# Patient Record
Sex: MALE | Race: WHITE | Employment: OTHER | URBAN - METROPOLITAN AREA
[De-identification: names, ages, dates, MRNs, and addresses within clinical notes are randomized per-mention and may not be internally consistent; named-entity substitution may affect disease eponyms.]

---

## 2022-07-17 ENCOUNTER — HOSPITAL ENCOUNTER (EMERGENCY)
Age: 57
Discharge: HOME | End: 2022-07-17

## 2022-07-17 ENCOUNTER — APPOINTMENT (OUTPATIENT)
Dept: CT IMAGING | Age: 57
End: 2022-07-17

## 2022-07-17 VITALS
BODY MASS INDEX: 31.83 KG/M2 | HEIGHT: 68 IN | RESPIRATION RATE: 14 BRPM | OXYGEN SATURATION: 100 % | WEIGHT: 210 LBS | DIASTOLIC BLOOD PRESSURE: 75 MMHG | HEART RATE: 70 BPM | SYSTOLIC BLOOD PRESSURE: 167 MMHG | TEMPERATURE: 97.6 F

## 2022-07-17 DIAGNOSIS — S05.32XA: Primary | ICD-10-CM

## 2022-07-17 PROCEDURE — 70450 CT HEAD/BRAIN W/O DYE: CPT

## 2022-07-17 PROCEDURE — 90714 TD VACC NO PRESV 7 YRS+ IM: CPT | Performed by: PHYSICIAN ASSISTANT

## 2022-07-17 PROCEDURE — 6370000000 HC RX 637 (ALT 250 FOR IP): Performed by: PHYSICIAN ASSISTANT

## 2022-07-17 PROCEDURE — 70486 CT MAXILLOFACIAL W/O DYE: CPT

## 2022-07-17 PROCEDURE — 90471 IMMUNIZATION ADMIN: CPT | Performed by: PHYSICIAN ASSISTANT

## 2022-07-17 PROCEDURE — 6360000002 HC RX W HCPCS: Performed by: PHYSICIAN ASSISTANT

## 2022-07-17 PROCEDURE — 99284 EMERGENCY DEPT VISIT MOD MDM: CPT

## 2022-07-17 RX ORDER — OXYCODONE HYDROCHLORIDE AND ACETAMINOPHEN 5; 325 MG/1; MG/1
1 TABLET ORAL ONCE
Status: COMPLETED | OUTPATIENT
Start: 2022-07-17 | End: 2022-07-17

## 2022-07-17 RX ORDER — TETANUS AND DIPHTHERIA TOXOIDS ADSORBED 2; 2 [LF]/.5ML; [LF]/.5ML
0.5 INJECTION INTRAMUSCULAR ONCE
Status: COMPLETED | OUTPATIENT
Start: 2022-07-17 | End: 2022-07-17

## 2022-07-17 RX ADMIN — OXYCODONE AND ACETAMINOPHEN 1 TABLET: 5; 325 TABLET ORAL at 13:06

## 2022-07-17 RX ADMIN — TETANUS AND DIPHTHERIA TOXOIDS ADSORBED 0.5 ML: 2; 2 INJECTION INTRAMUSCULAR at 10:22

## 2022-07-17 ASSESSMENT — PAIN DESCRIPTION - ORIENTATION
ORIENTATION: LEFT

## 2022-07-17 ASSESSMENT — PAIN SCALES - GENERAL
PAINLEVEL_OUTOF10: 6
PAINLEVEL_OUTOF10: 5
PAINLEVEL_OUTOF10: 7
PAINLEVEL_OUTOF10: 6
PAINLEVEL_OUTOF10: 5

## 2022-07-17 ASSESSMENT — PAIN - FUNCTIONAL ASSESSMENT
PAIN_FUNCTIONAL_ASSESSMENT: PREVENTS OR INTERFERES SOME ACTIVE ACTIVITIES AND ADLS
PAIN_FUNCTIONAL_ASSESSMENT: 0-10
PAIN_FUNCTIONAL_ASSESSMENT: PREVENTS OR INTERFERES SOME ACTIVE ACTIVITIES AND ADLS
PAIN_FUNCTIONAL_ASSESSMENT: ACTIVITIES ARE NOT PREVENTED

## 2022-07-17 ASSESSMENT — PAIN DESCRIPTION - PAIN TYPE
TYPE: ACUTE PAIN

## 2022-07-17 ASSESSMENT — PAIN DESCRIPTION - LOCATION
LOCATION: EYE

## 2022-07-17 ASSESSMENT — PAIN DESCRIPTION - ONSET
ONSET: SUDDEN

## 2022-07-17 ASSESSMENT — VISUAL ACUITY
OD: 20/15
OU: 20/15
OS: 0/0

## 2022-07-17 ASSESSMENT — PAIN DESCRIPTION - FREQUENCY
FREQUENCY: CONTINUOUS

## 2022-07-17 ASSESSMENT — PAIN DESCRIPTION - DESCRIPTORS
DESCRIPTORS: DULL
DESCRIPTORS: ACHING
DESCRIPTORS: DULL

## 2022-07-17 NOTE — DISCHARGE INSTRUCTIONS
YOU HAVE TRAUMATIC RUPTURE OF LEFT EYE GLOBE.   GO DIRECTLY TO ED. I SPOKE TO DR. [address] DO NOT STOP OR GO HOME.     DIRECTLY TO ED

## 2022-07-17 NOTE — ED PROVIDER NOTES
ED Attending shared visit  CC: Yes                                                                                                                                      Department of Emergency Medicine   ED  Provider Note  Admit Date/RoomTime: 7/17/2022  9:06 AM  ED Room: 21/21        HPI:  7/17/22,   Time: 9:29 AM EDT         Shyam Madsen is a 60 y.o. male presenting to the ED for traumatic left eye injury, beginning just prior to arrival.  The complaint has been persistent, moderate in severity, and worsened by nothing. The patient is an otherwise healthy 76-year-old male who states that he was golfing this morning when he was struck directly in the left eye by a golf ball. He states that he was on the [employer] and they were about 50 yards away. He states that he has lost all vision in the left eye. Reports he can see \"only blood\" he has wound that is gaping and bleeding lateral to the eye. The patient reports that he is due for a tetanus shot. He was not wearing any contacts. Denies loss of consciousness. Denies headache. He had some mild nausea initially but no vomiting or confusion. The patient is not on any blood thinners. ROS:     Constitutional: Negative for fever and chills  HENT: Negative for ear pain, sore throat and sinus pressure  Eyes:  See HPI  Respiratory:  Negative for shortness of breath, cough and wheezing  Cardiovascular: Negative for CP, edema or palpitations  Gastrointestinal: Negative for nausea, vomiting, diarrhea and abdominal distention  Genitourinary: Negative for dysuria and frequency  Musculoskeletal: Negative for back pain and arthralgia  Skin: Negative for rash and wound  Neurological: Negative for weakness and headaches  Hematological: Negative for adenopathy    All other systems reviewed and are negative      -------------------------------- PAST HISTORY ----------------------------------  Past Medical History:  has no past medical history on file.     Past Surgical History:  has no past surgical history on file. Social History:      Family History: family history is not on file. The patients home medications have been reviewed. Allergies: Penicillins    --------------------------------- RESULTS ------------------------------------------  All laboratory and radiology results have been personally reviewed by myself   LABS:  No results found for this visit on 07/17/22. RADIOLOGY:  Interpreted by Radiologist.  [address] [facility]– [place]   Final Result   1. Deformed left globe which contains acute hemorrhage. These findings are   concerning for rupture of the left globe. Pre and postseptal fluid also   noted in the left orbit. 2.  No acute intracranial abnormality. 3.  Paranasal sinus disease as detailed above. RECOMMENDATIONS:   Unavailable         CT FACIAL BONES WO CONTRAST   Final Result   1. Findings consistent with left globe rupture, as described above. 2.  Sinus disease, as described above. Rule out large polyp versus other   soft tissue lesion in the right nasal cavity. ENT consultation is   recommended. 3.  Small amount of left-sided periorbital soft tissue edema. ----------------- NURSING NOTES AND VITALS REVIEWED ---------------   The nursing notes within the ED encounter and vital signs as below have been reviewed. BP (!) 178/92   Pulse 69   Temp 97.6 °F (36.4 °C) (Oral)   Resp 14   Ht 5' 8\" (1.727 m)   Wt 210 lb (95.3 kg)   SpO2 97%   BMI 31.93 kg/m²   Oxygen Saturation Interpretation: Normal      --------------------------------PHYSICAL EXAM------------------------------------      Constitutional/General: Alert and oriented x3, well appearing, non toxic in NAD  Head: NC/AT  Eyes:  Markedly abnormal exam.   Pt has traumatic left eye injury. >50% blood seen in anterior chamber. Marked bullous subconjunctival hemorrhage. Upper and lower lids intact. Pupil is not reacting, appears to have normal shape.

## 2022-07-17 NOTE — PROGRESS NOTES
Seth Plascencia ED for the 4th time at phone number [phone].  advised that most of the nurses were travelers and will not take report. She said if no one answers, to just send the Patient. I waited on hold for 5 minutes and hung up.

## 2022-07-17 NOTE — ED NOTES
Pt has swelling, laceration, and pain to left eye.  Ice pack given per Pt request.     Santana Gruber RN  07/17/22 [id number]